# Patient Record
Sex: MALE | Race: WHITE | NOT HISPANIC OR LATINO | Employment: FULL TIME | URBAN - METROPOLITAN AREA
[De-identification: names, ages, dates, MRNs, and addresses within clinical notes are randomized per-mention and may not be internally consistent; named-entity substitution may affect disease eponyms.]

---

## 2023-03-09 DIAGNOSIS — E11.69 DM TYPE 2 WITH DIABETIC MIXED HYPERLIPIDEMIA (MULTI): Primary | ICD-10-CM

## 2023-03-09 DIAGNOSIS — E78.2 DM TYPE 2 WITH DIABETIC MIXED HYPERLIPIDEMIA (MULTI): Primary | ICD-10-CM

## 2023-03-09 RX ORDER — ATORVASTATIN CALCIUM 10 MG/1
10 TABLET, FILM COATED ORAL NIGHTLY
Qty: 90 TABLET | Refills: 3 | Status: SHIPPED | OUTPATIENT
Start: 2023-03-09 | End: 2024-02-23

## 2023-03-09 RX ORDER — ATORVASTATIN CALCIUM 10 MG/1
10 TABLET, FILM COATED ORAL NIGHTLY
COMMUNITY
End: 2023-03-09 | Stop reason: SDUPTHER

## 2023-03-09 NOTE — TELEPHONE ENCOUNTER
Requested Prescriptions     Pending Prescriptions Disp Refills    atorvastatin (Lipitor) 10 mg tablet 90 tablet 3     Sig: Take 1 tablet (10 mg) by mouth once daily at bedtime.     Last seen in office 1/10/2023.    Please advise.

## 2023-03-23 DIAGNOSIS — E11.9 TYPE 2 DIABETES MELLITUS WITHOUT COMPLICATION, WITHOUT LONG-TERM CURRENT USE OF INSULIN (MULTI): Primary | ICD-10-CM

## 2023-03-23 RX ORDER — METFORMIN HYDROCHLORIDE 500 MG/1
2 TABLET ORAL 2 TIMES DAILY
COMMUNITY
Start: 2018-07-01 | End: 2023-03-23 | Stop reason: SDUPTHER

## 2023-03-24 RX ORDER — METFORMIN HYDROCHLORIDE 500 MG/1
1000 TABLET ORAL 2 TIMES DAILY
Qty: 180 TABLET | Refills: 3 | Status: SHIPPED | OUTPATIENT
Start: 2023-03-24 | End: 2023-09-07

## 2023-08-01 PROBLEM — R19.5 POSITIVE COLORECTAL CANCER SCREENING USING COLOGUARD TEST: Status: ACTIVE | Noted: 2023-08-01

## 2023-08-01 PROBLEM — H52.13 BILATERAL MYOPIA: Status: ACTIVE | Noted: 2023-08-01

## 2023-08-01 PROBLEM — E11.9 TYPE 2 DIABETES MELLITUS WITHOUT COMPLICATIONS (MULTI): Status: ACTIVE | Noted: 2023-08-01

## 2023-08-01 PROBLEM — K12.0 RECURRENT CANKER SORES: Status: ACTIVE | Noted: 2023-08-01

## 2023-08-01 PROBLEM — I10 HTN (HYPERTENSION), BENIGN: Status: ACTIVE | Noted: 2023-08-01

## 2023-08-01 PROBLEM — I73.9 PAD (PERIPHERAL ARTERY DISEASE) (CMS-HCC): Status: ACTIVE | Noted: 2023-08-01

## 2023-08-01 PROBLEM — M79.604 PAIN OF RIGHT LOWER EXTREMITY: Status: ACTIVE | Noted: 2023-08-01

## 2023-08-01 PROBLEM — H53.8 BLURRED VISION: Status: ACTIVE | Noted: 2023-08-01

## 2023-08-01 PROBLEM — E78.5 HYPERLIPIDEMIA: Status: ACTIVE | Noted: 2023-08-01

## 2023-08-01 PROBLEM — R81 GLYCOSURIA: Status: ACTIVE | Noted: 2023-08-01

## 2023-08-01 PROBLEM — M21.622 TAILOR'S BUNION OF BOTH FEET: Status: ACTIVE | Noted: 2023-08-01

## 2023-08-01 PROBLEM — I73.9 INTERMITTENT CLAUDICATION (CMS-HCC): Status: ACTIVE | Noted: 2023-08-01

## 2023-08-01 PROBLEM — R79.89 ABNORMAL LFTS: Status: ACTIVE | Noted: 2023-08-01

## 2023-08-01 PROBLEM — L85.3 XEROSIS OF SKIN: Status: ACTIVE | Noted: 2023-08-01

## 2023-08-01 PROBLEM — H52.4 BILATERAL PRESBYOPIA: Status: ACTIVE | Noted: 2023-08-01

## 2023-08-01 PROBLEM — M21.621 TAILOR'S BUNION OF BOTH FEET: Status: ACTIVE | Noted: 2023-08-01

## 2023-08-01 PROBLEM — E11.3292: Status: ACTIVE | Noted: 2023-08-01

## 2023-08-01 RX ORDER — LISINOPRIL AND HYDROCHLOROTHIAZIDE 12.5; 2 MG/1; MG/1
0.5 TABLET ORAL DAILY
COMMUNITY
Start: 2018-07-18 | End: 2024-02-12 | Stop reason: SDUPTHER

## 2023-08-01 RX ORDER — ASPIRIN 81 MG/1
1 TABLET ORAL DAILY
COMMUNITY
Start: 2018-07-18

## 2023-08-01 RX ORDER — BLOOD SUGAR DIAGNOSTIC
STRIP MISCELLANEOUS
COMMUNITY
Start: 2018-09-13

## 2023-08-01 RX ORDER — PIOGLITAZONEHYDROCHLORIDE 30 MG/1
1 TABLET ORAL DAILY
COMMUNITY
Start: 2022-08-18 | End: 2023-11-08 | Stop reason: SDUPTHER

## 2023-08-01 RX ORDER — GLIPIZIDE 5 MG/1
TABLET ORAL
COMMUNITY
Start: 2019-10-30 | End: 2023-08-18 | Stop reason: SDUPTHER

## 2023-08-07 PROBLEM — I73.9 CLAUDICATION, INTERMITTENT (CMS-HCC): Status: ACTIVE | Noted: 2023-08-07

## 2023-08-07 PROBLEM — E11.9 DM TYPE 2 (DIABETES MELLITUS, TYPE 2) (MULTI): Status: ACTIVE | Noted: 2023-08-07

## 2023-08-08 ENCOUNTER — OFFICE VISIT (OUTPATIENT)
Dept: PRIMARY CARE | Facility: CLINIC | Age: 53
End: 2023-08-08
Payer: COMMERCIAL

## 2023-08-08 VITALS
RESPIRATION RATE: 18 BRPM | HEART RATE: 87 BPM | HEIGHT: 73 IN | OXYGEN SATURATION: 97 % | SYSTOLIC BLOOD PRESSURE: 130 MMHG | DIASTOLIC BLOOD PRESSURE: 78 MMHG | BODY MASS INDEX: 30.09 KG/M2 | TEMPERATURE: 95.5 F | WEIGHT: 227 LBS

## 2023-08-08 DIAGNOSIS — E11.3292: ICD-10-CM

## 2023-08-08 DIAGNOSIS — E78.5 HYPERLIPIDEMIA, UNSPECIFIED HYPERLIPIDEMIA TYPE: ICD-10-CM

## 2023-08-08 DIAGNOSIS — I73.9 INTERMITTENT CLAUDICATION (CMS-HCC): ICD-10-CM

## 2023-08-08 DIAGNOSIS — Z12.5 PROSTATE CANCER SCREENING: ICD-10-CM

## 2023-08-08 DIAGNOSIS — Z00.00 ANNUAL PHYSICAL EXAM: Primary | ICD-10-CM

## 2023-08-08 DIAGNOSIS — R79.89 ABNORMAL LFTS: ICD-10-CM

## 2023-08-08 DIAGNOSIS — E11.9 TYPE 2 DIABETES MELLITUS WITHOUT COMPLICATION, WITHOUT LONG-TERM CURRENT USE OF INSULIN (MULTI): ICD-10-CM

## 2023-08-08 LAB — POC HEMOGLOBIN A1C: 7.5 % (ref 4.2–6.5)

## 2023-08-08 PROCEDURE — 1036F TOBACCO NON-USER: CPT | Performed by: INTERNAL MEDICINE

## 2023-08-08 PROCEDURE — 3075F SYST BP GE 130 - 139MM HG: CPT | Performed by: INTERNAL MEDICINE

## 2023-08-08 PROCEDURE — 3078F DIAST BP <80 MM HG: CPT | Performed by: INTERNAL MEDICINE

## 2023-08-08 PROCEDURE — 83036 HEMOGLOBIN GLYCOSYLATED A1C: CPT | Performed by: INTERNAL MEDICINE

## 2023-08-08 PROCEDURE — 99396 PREV VISIT EST AGE 40-64: CPT | Performed by: INTERNAL MEDICINE

## 2023-08-08 PROCEDURE — 99214 OFFICE O/P EST MOD 30 MIN: CPT | Performed by: INTERNAL MEDICINE

## 2023-08-08 PROCEDURE — 93000 ELECTROCARDIOGRAM COMPLETE: CPT | Performed by: INTERNAL MEDICINE

## 2023-08-08 ASSESSMENT — PATIENT HEALTH QUESTIONNAIRE - PHQ9
2. FEELING DOWN, DEPRESSED OR HOPELESS: NOT AT ALL
1. LITTLE INTEREST OR PLEASURE IN DOING THINGS: NOT AT ALL
SUM OF ALL RESPONSES TO PHQ9 QUESTIONS 1 AND 2: 0

## 2023-08-08 ASSESSMENT — PAIN SCALES - GENERAL: PAINLEVEL: 0-NO PAIN

## 2023-08-08 NOTE — PATIENT INSTRUCTIONS
Plan  Annual physical done  EKG today   Discussed about repeat colonoscopy and shingle shot . Declines at present . Will let me know if decides  Blood and urine test ordered  Current medications are effective. advised to continue current medications.  F/U 6 months and yearly physical n 12 months .

## 2023-08-08 NOTE — PROGRESS NOTES
"My nurse note reviewed. Patient is here for:  7 month FUV and Annual Exam       Patient denies any shortness of breath, PND, orthopnea, chest pain , palpitation, syncope or edema in legs  Doing regular bike exercise   Quit smoking 3 months back .   Patient denies any shortness of breath, PND, orthopnea, chest pain , palpitation, syncope or edema in legs  patient denies any abdominal pain, tenderness, nausea, vomiting, change in bowel habits or blood in stool.  Patient denies any weakness in extremities.. Denies any headache, visual symptoms , speech problems or  tremors . No TIA or stroke like symptoms..    Taking meds ok     REVIEW OF SYSTEMS:   All other systems have been reviewed and are negative in relation to patient's complaint and other than what is mentioned in History of present illness.     OBJECTIVE :  Vitals noted   /78 (BP Location: Left arm, Patient Position: Sitting, BP Cuff Size: Adult)   Pulse 87   Temp 35.3 °C (95.5 °F)   Resp 18   Ht 1.854 m (6' 1\")   Wt 103 kg (227 lb)   SpO2 97%   BMI 29.95 kg/m²     Not in acute distress  Conj Pink, No icterus  Neck:No Cervical LN enlargement, No Thyroid enlargement No carotid bruit  Lungs: good air entry bilaterally, no rales or rhonchi  CVS: S1 S2 + , no S3. No loud heart murmur heard.   Abdomen: Soft, non tender , BS +. no organomegaly , no CVA tenderness  MSK: No spine tenderness or muscle tenderness noted on gross examination  CNS: Pt is alert, moving all 4 extremities. no motor weakness or abnormal movements noted on gross examination.  Extremities: No edema, No calf tenderness, Jaime's sign negative.      Assessment:  1. Annual physical exam  Done. Tests ordered      2. Type 2 diabetes mellitus without complication, without long-term current use of insulin (CMS/[facility])  POCT glycosylated hemoglobin (Hb A1C) manually resulted    ECG 12 lead    CBC and Auto Differential    Basic Metabolic Panel    Hepatic Function Panel    Lipid Panel Non-Fasting "    Thyroid Stimulating Hormone    Albumin , Urine Random      3. Mild nonproliferative diabetic retinopathy of left eye associated with type 2 diabetes mellitus, macular edema presence unspecified (CMS/HCC)  Sees eye doctor once a yr      4. Hyperlipidemia, unspecified hyperlipidemia type  ECG 12 lead      5. Intermittent claudication (CMS/HCC)  Symptoms better. Had PVR recently  reviewed results with pt      6. Abnormal LFTs  Hx of. recheck      7. Prostate cancer screening  Prostate Specific Antigen          Plan  Annual physical done  EKG today -was normal. Discussed with pt.   Discussed about repeat colonoscopy and shingle shot . Declines at present . Will let me know if decides  Blood and urine test ordered  A1C 7.5 . Discussed with pt  Congratulated on quitting smoking   Current medications are effective. advised to continue current medications.  F/U 6 months and yearly physical n 12 months .

## 2023-08-11 ENCOUNTER — LAB (OUTPATIENT)
Dept: LAB | Facility: LAB | Age: 53
End: 2023-08-11
Payer: COMMERCIAL

## 2023-08-11 DIAGNOSIS — Z12.5 PROSTATE CANCER SCREENING: ICD-10-CM

## 2023-08-11 DIAGNOSIS — E11.9 TYPE 2 DIABETES MELLITUS WITHOUT COMPLICATION, WITHOUT LONG-TERM CURRENT USE OF INSULIN (MULTI): ICD-10-CM

## 2023-08-11 LAB
ALANINE AMINOTRANSFERASE (SGPT) (U/L) IN SER/PLAS: 37 U/L (ref 10–52)
ALBUMIN (G/DL) IN SER/PLAS: 4 G/DL (ref 3.4–5)
ALBUMIN (MG/L) IN URINE: <7 MG/L
ALBUMIN/CREATININE (UG/MG) IN URINE: NORMAL UG/MG CRT (ref 0–30)
ALKALINE PHOSPHATASE (U/L) IN SER/PLAS: 86 U/L (ref 33–120)
ANION GAP IN SER/PLAS: 13 MMOL/L (ref 10–20)
ASPARTATE AMINOTRANSFERASE (SGOT) (U/L) IN SER/PLAS: 23 U/L (ref 9–39)
BASOPHILS (10*3/UL) IN BLOOD BY AUTOMATED COUNT: 0.04 X10E9/L (ref 0–0.1)
BASOPHILS/100 LEUKOCYTES IN BLOOD BY AUTOMATED COUNT: 0.5 % (ref 0–2)
BILIRUBIN DIRECT (MG/DL) IN SER/PLAS: 0.1 MG/DL (ref 0–0.3)
BILIRUBIN TOTAL (MG/DL) IN SER/PLAS: 0.4 MG/DL (ref 0–1.2)
CALCIUM (MG/DL) IN SER/PLAS: 9.5 MG/DL (ref 8.6–10.6)
CARBON DIOXIDE, TOTAL (MMOL/L) IN SER/PLAS: 27 MMOL/L (ref 21–32)
CHLORIDE (MMOL/L) IN SER/PLAS: 103 MMOL/L (ref 98–107)
CHOLESTEROL (MG/DL) IN SER/PLAS: 157 MG/DL (ref 0–199)
CHOLESTEROL IN HDL (MG/DL) IN SER/PLAS: 27.7 MG/DL
CHOLESTEROL/HDL RATIO: 5.7
CREATININE (MG/DL) IN SER/PLAS: 0.72 MG/DL (ref 0.5–1.3)
CREATININE (MG/DL) IN URINE: 59.2 MG/DL (ref 20–370)
EOSINOPHILS (10*3/UL) IN BLOOD BY AUTOMATED COUNT: 0.32 X10E9/L (ref 0–0.7)
EOSINOPHILS/100 LEUKOCYTES IN BLOOD BY AUTOMATED COUNT: 3.9 % (ref 0–6)
ERYTHROCYTE DISTRIBUTION WIDTH (RATIO) BY AUTOMATED COUNT: 15.6 % (ref 11.5–14.5)
ERYTHROCYTE MEAN CORPUSCULAR HEMOGLOBIN CONCENTRATION (G/DL) BY AUTOMATED: 31.5 G/DL (ref 32–36)
ERYTHROCYTE MEAN CORPUSCULAR VOLUME (FL) BY AUTOMATED COUNT: 81 FL (ref 80–100)
ERYTHROCYTES (10*6/UL) IN BLOOD BY AUTOMATED COUNT: 5.15 X10E12/L (ref 4.5–5.9)
GFR MALE: >90 ML/MIN/1.73M2
GLUCOSE (MG/DL) IN SER/PLAS: 116 MG/DL (ref 74–99)
HEMATOCRIT (%) IN BLOOD BY AUTOMATED COUNT: 41.6 % (ref 41–52)
HEMOGLOBIN (G/DL) IN BLOOD: 13.1 G/DL (ref 13.5–17.5)
IMMATURE GRANULOCYTES/100 LEUKOCYTES IN BLOOD BY AUTOMATED COUNT: 0.6 % (ref 0–0.9)
LEUKOCYTES (10*3/UL) IN BLOOD BY AUTOMATED COUNT: 8.2 X10E9/L (ref 4.4–11.3)
LYMPHOCYTES (10*3/UL) IN BLOOD BY AUTOMATED COUNT: 1.6 X10E9/L (ref 1.2–4.8)
LYMPHOCYTES/100 LEUKOCYTES IN BLOOD BY AUTOMATED COUNT: 19.4 % (ref 13–44)
MONOCYTES (10*3/UL) IN BLOOD BY AUTOMATED COUNT: 0.81 X10E9/L (ref 0.1–1)
MONOCYTES/100 LEUKOCYTES IN BLOOD BY AUTOMATED COUNT: 9.8 % (ref 2–10)
NEUTROPHILS (10*3/UL) IN BLOOD BY AUTOMATED COUNT: 5.41 X10E9/L (ref 1.2–7.7)
NEUTROPHILS/100 LEUKOCYTES IN BLOOD BY AUTOMATED COUNT: 65.8 % (ref 40–80)
NON-HDL CHOLESTEROL: 129 MG/DL
NRBC (PER 100 WBCS) BY AUTOMATED COUNT: 0 /100 WBC (ref 0–0)
PLATELETS (10*3/UL) IN BLOOD AUTOMATED COUNT: 376 X10E9/L (ref 150–450)
POTASSIUM (MMOL/L) IN SER/PLAS: 4.8 MMOL/L (ref 3.5–5.3)
PROSTATE SPECIFIC AG (NG/ML) IN SER/PLAS: 0.69 NG/ML (ref 0–4)
PROTEIN TOTAL: 6.4 G/DL (ref 6.4–8.2)
SODIUM (MMOL/L) IN SER/PLAS: 138 MMOL/L (ref 136–145)
THYROTROPIN (MIU/L) IN SER/PLAS BY DETECTION LIMIT <= 0.05 MIU/L: 1.62 MIU/L (ref 0.44–3.98)
UREA NITROGEN (MG/DL) IN SER/PLAS: 13 MG/DL (ref 6–23)

## 2023-08-11 PROCEDURE — 85025 COMPLETE CBC W/AUTO DIFF WBC: CPT

## 2023-08-11 PROCEDURE — 83718 ASSAY OF LIPOPROTEIN: CPT

## 2023-08-11 PROCEDURE — 80048 BASIC METABOLIC PNL TOTAL CA: CPT

## 2023-08-11 PROCEDURE — 80076 HEPATIC FUNCTION PANEL: CPT

## 2023-08-11 PROCEDURE — 84443 ASSAY THYROID STIM HORMONE: CPT

## 2023-08-11 PROCEDURE — 82465 ASSAY BLD/SERUM CHOLESTEROL: CPT

## 2023-08-11 PROCEDURE — 36415 COLL VENOUS BLD VENIPUNCTURE: CPT

## 2023-08-11 PROCEDURE — 82043 UR ALBUMIN QUANTITATIVE: CPT

## 2023-08-11 PROCEDURE — 84153 ASSAY OF PSA TOTAL: CPT

## 2023-08-11 PROCEDURE — 82570 ASSAY OF URINE CREATININE: CPT

## 2023-08-14 ENCOUNTER — TELEPHONE (OUTPATIENT)
Dept: PRIMARY CARE | Facility: CLINIC | Age: 53
End: 2023-08-14
Payer: COMMERCIAL

## 2023-08-14 NOTE — TELEPHONE ENCOUNTER
----- Message from Chrissy Mckeon sent at 8/14/2023 12:21 PM EDT -----    ----- Message -----  From: Lance Fregoso MD  Sent: 8/14/2023  12:00 PM EDT  To: Chrissy Mckeon    I have reviewed your recent blood tests ordered by me and there were no significant abnormality noted as compared to before. Good cholesterol, HDL, was again very low. Regular exercise should improve it   Please notify patient. Thanks.

## 2023-08-18 DIAGNOSIS — E11.9 TYPE 2 DIABETES MELLITUS WITHOUT COMPLICATION, WITH LONG-TERM CURRENT USE OF INSULIN (MULTI): Primary | ICD-10-CM

## 2023-08-18 DIAGNOSIS — Z79.4 TYPE 2 DIABETES MELLITUS WITHOUT COMPLICATION, WITH LONG-TERM CURRENT USE OF INSULIN (MULTI): Primary | ICD-10-CM

## 2023-08-19 RX ORDER — GLIPIZIDE 5 MG/1
TABLET ORAL
Qty: 270 TABLET | Refills: 3 | Status: SHIPPED | OUTPATIENT
Start: 2023-08-19

## 2023-09-07 DIAGNOSIS — E11.9 TYPE 2 DIABETES MELLITUS WITHOUT COMPLICATION, WITHOUT LONG-TERM CURRENT USE OF INSULIN (MULTI): ICD-10-CM

## 2023-09-07 RX ORDER — METFORMIN HYDROCHLORIDE 500 MG/1
TABLET ORAL
Qty: 120 TABLET | Refills: 5 | Status: SHIPPED | OUTPATIENT
Start: 2023-09-07 | End: 2024-02-22 | Stop reason: SDUPTHER

## 2023-11-08 DIAGNOSIS — E11.9 TYPE 2 DIABETES MELLITUS WITHOUT COMPLICATION, WITH LONG-TERM CURRENT USE OF INSULIN (MULTI): Primary | ICD-10-CM

## 2023-11-08 DIAGNOSIS — Z79.4 TYPE 2 DIABETES MELLITUS WITHOUT COMPLICATION, WITH LONG-TERM CURRENT USE OF INSULIN (MULTI): Primary | ICD-10-CM

## 2023-11-09 RX ORDER — PIOGLITAZONEHYDROCHLORIDE 30 MG/1
30 TABLET ORAL DAILY
Qty: 90 TABLET | Refills: 3 | Status: SHIPPED | OUTPATIENT
Start: 2023-11-09 | End: 2024-01-23 | Stop reason: SDUPTHER

## 2024-01-15 ENCOUNTER — OFFICE VISIT (OUTPATIENT)
Dept: OPHTHALMOLOGY | Facility: CLINIC | Age: 54
End: 2024-01-15
Payer: COMMERCIAL

## 2024-01-15 DIAGNOSIS — E11.9 TYPE 2 DIABETES MELLITUS WITHOUT COMPLICATION, WITHOUT LONG-TERM CURRENT USE OF INSULIN (MULTI): Primary | ICD-10-CM

## 2024-01-15 DIAGNOSIS — H52.13 BILATERAL MYOPIA: ICD-10-CM

## 2024-01-15 DIAGNOSIS — H52.4 BILATERAL PRESBYOPIA: ICD-10-CM

## 2024-01-15 PROCEDURE — 92015 DETERMINE REFRACTIVE STATE: CPT | Performed by: OPTOMETRIST

## 2024-01-15 PROCEDURE — 99214 OFFICE O/P EST MOD 30 MIN: CPT | Performed by: OPTOMETRIST

## 2024-01-15 ASSESSMENT — REFRACTION_MANIFEST
OS_SPHERE: -1.00
OS_CYLINDER: -0.50
OD_CYLINDER: -0.25
OS_SPHERE: -0.25
OD_AXIS: 085
OD_SPHERE: PLANO
OS_ADD: +1.75
OS_AXIS: 110
OD_AXIS: 087
OD_CYLINDER: -0.75
OD_ADD: +1.75
OD_SPHERE: -0.50
METHOD_AUTOREFRACTION: 1
OS_CYLINDER: SPHERE

## 2024-01-15 ASSESSMENT — REFRACTION_WEARINGRX
SPECS_TYPE: PAL
OS_CYLINDER: SPHERE
OS_SPHERE: -1.25
OS_CYLINDER: SPHERE
OD_SPHERE: -0.50
OD_SPHERE: -0.50
OS_ADD: +2.25
OD_ADD: +0.75
OD_CYLINDER: SPHERE
OS_SPHERE: -1.25
OS_ADD: +0.75
OD_ADD: +2.25
OD_CYLINDER: SPHERE

## 2024-01-15 ASSESSMENT — CONF VISUAL FIELD
OD_SUPERIOR_TEMPORAL_RESTRICTION: 0
METHOD: COUNTING FINGERS
OD_NORMAL: 1
OD_INFERIOR_TEMPORAL_RESTRICTION: 0
OS_INFERIOR_NASAL_RESTRICTION: 0
OS_NORMAL: 1
OS_INFERIOR_TEMPORAL_RESTRICTION: 0
OD_SUPERIOR_NASAL_RESTRICTION: 0
OS_SUPERIOR_NASAL_RESTRICTION: 0
OD_INFERIOR_NASAL_RESTRICTION: 0
OS_SUPERIOR_TEMPORAL_RESTRICTION: 0

## 2024-01-15 ASSESSMENT — REFRACTION
OS_CYLINDER: SPHERE
OD_AXIS: 085
OD_SPHERE: -0.50
OS_SPHERE: -0.50
OD_CYLINDER: -0.25

## 2024-01-15 ASSESSMENT — CUP TO DISC RATIO
OD_RATIO: .15
OS_RATIO: .15

## 2024-01-15 ASSESSMENT — ENCOUNTER SYMPTOMS
EYES NEGATIVE: 0
CARDIOVASCULAR NEGATIVE: 0
PSYCHIATRIC NEGATIVE: 0
GASTROINTESTINAL NEGATIVE: 0
ENDOCRINE NEGATIVE: 0
NEUROLOGICAL NEGATIVE: 0
MUSCULOSKELETAL NEGATIVE: 0
RESPIRATORY NEGATIVE: 0
ALLERGIC/IMMUNOLOGIC NEGATIVE: 0
CONSTITUTIONAL NEGATIVE: 0
HEMATOLOGIC/LYMPHATIC NEGATIVE: 0

## 2024-01-15 ASSESSMENT — TONOMETRY
IOP_METHOD: GOLDMANN APPLANATION
OS_IOP_MMHG: 16
OD_IOP_MMHG: 15

## 2024-01-15 ASSESSMENT — VISUAL ACUITY
OS_CC: 20/20
METHOD: SNELLEN - LINEAR
OS_CC+: .
CORRECTION_TYPE: GLASSES
OD_CC: 20/20

## 2024-01-15 ASSESSMENT — EXTERNAL EXAM - RIGHT EYE: OD_EXAM: NORMAL

## 2024-01-15 ASSESSMENT — EXTERNAL EXAM - LEFT EYE: OS_EXAM: NORMAL

## 2024-01-15 ASSESSMENT — SLIT LAMP EXAM - LIDS
COMMENTS: GOOD POSITION
COMMENTS: GOOD POSITION

## 2024-01-15 NOTE — PROGRESS NOTES
Assessment/Plan   Diagnoses and all orders for this visit:  Myopia, bilateral  Bilateral presbyopia  Type 2 diabetes mellitus without complication, without long-term current use of insulin (CMS/[facility])    New spec rx released today per patient request. Ocular health wnl for age OU. Monitor 1 year or sooner prn. Refraction billed today.     No signs of diabetic retinopathy today ou. Discussed cont good bs control, cont bp and lipid control as well. RTC with any changes in vision, otherwise monitor 1 year.

## 2024-01-23 DIAGNOSIS — Z79.4 TYPE 2 DIABETES MELLITUS WITHOUT COMPLICATION, WITH LONG-TERM CURRENT USE OF INSULIN (MULTI): ICD-10-CM

## 2024-01-23 DIAGNOSIS — E11.9 TYPE 2 DIABETES MELLITUS WITHOUT COMPLICATION, WITH LONG-TERM CURRENT USE OF INSULIN (MULTI): ICD-10-CM

## 2024-01-23 RX ORDER — PIOGLITAZONEHYDROCHLORIDE 30 MG/1
30 TABLET ORAL DAILY
Qty: 90 TABLET | Refills: 3 | Status: SHIPPED | OUTPATIENT
Start: 2024-01-23 | End: 2024-01-29 | Stop reason: SDUPTHER

## 2024-01-29 DIAGNOSIS — E11.9 TYPE 2 DIABETES MELLITUS WITHOUT COMPLICATION, WITH LONG-TERM CURRENT USE OF INSULIN (MULTI): ICD-10-CM

## 2024-01-29 DIAGNOSIS — Z79.4 TYPE 2 DIABETES MELLITUS WITHOUT COMPLICATION, WITH LONG-TERM CURRENT USE OF INSULIN (MULTI): ICD-10-CM

## 2024-01-29 RX ORDER — PIOGLITAZONEHYDROCHLORIDE 30 MG/1
30 TABLET ORAL DAILY
Qty: 90 TABLET | Refills: 3 | Status: SHIPPED | OUTPATIENT
Start: 2024-01-29 | End: 2024-01-30 | Stop reason: SDUPTHER

## 2024-01-30 DIAGNOSIS — E11.9 TYPE 2 DIABETES MELLITUS WITHOUT COMPLICATION, WITH LONG-TERM CURRENT USE OF INSULIN (MULTI): ICD-10-CM

## 2024-01-30 DIAGNOSIS — Z79.4 TYPE 2 DIABETES MELLITUS WITHOUT COMPLICATION, WITH LONG-TERM CURRENT USE OF INSULIN (MULTI): ICD-10-CM

## 2024-01-30 RX ORDER — PIOGLITAZONEHYDROCHLORIDE 30 MG/1
30 TABLET ORAL DAILY
Qty: 90 TABLET | Refills: 3 | Status: SHIPPED | OUTPATIENT
Start: 2024-01-30

## 2024-02-06 ENCOUNTER — OFFICE VISIT (OUTPATIENT)
Dept: VASCULAR SURGERY | Facility: CLINIC | Age: 54
End: 2024-02-06
Payer: COMMERCIAL

## 2024-02-06 VITALS
WEIGHT: 235 LBS | HEIGHT: 73 IN | HEART RATE: 89 BPM | DIASTOLIC BLOOD PRESSURE: 70 MMHG | BODY MASS INDEX: 31.14 KG/M2 | OXYGEN SATURATION: 98 % | SYSTOLIC BLOOD PRESSURE: 128 MMHG

## 2024-02-06 DIAGNOSIS — I73.9 PAD (PERIPHERAL ARTERY DISEASE) (CMS-HCC): ICD-10-CM

## 2024-02-06 DIAGNOSIS — I73.9 CLAUDICATION, INTERMITTENT (CMS-HCC): Primary | ICD-10-CM

## 2024-02-06 DIAGNOSIS — E11.9 TYPE 2 DIABETES MELLITUS WITHOUT COMPLICATION, WITHOUT LONG-TERM CURRENT USE OF INSULIN (MULTI): ICD-10-CM

## 2024-02-06 DIAGNOSIS — E66.01 OBESITIES, MORBID (MULTI): ICD-10-CM

## 2024-02-06 PROCEDURE — 99214 OFFICE O/P EST MOD 30 MIN: CPT | Performed by: NURSE PRACTITIONER

## 2024-02-06 PROCEDURE — 3074F SYST BP LT 130 MM HG: CPT | Performed by: NURSE PRACTITIONER

## 2024-02-06 PROCEDURE — 1036F TOBACCO NON-USER: CPT | Performed by: NURSE PRACTITIONER

## 2024-02-06 PROCEDURE — 3078F DIAST BP <80 MM HG: CPT | Performed by: NURSE PRACTITIONER

## 2024-02-06 NOTE — PROGRESS NOTES
Jose Romano is a 53 y.o. male     Subjective   This is a 53-year-old male who returns for follow-up and evaluation of his PAD and intermittent claudication.  Overall patient's claudication symptoms have reduced he does get claudication symptoms at the end of the day sometimes with cramping especially on the left lower extremity.  He is now able to walk extensive.  Before developing claudication symptoms greater than 200 yards he does use an elliptical for 25 to 30 minutes 4 to 5 days a week.  He is a diabetic and he has gained 15 pounds since last visit.  Patient has stopped smoking encouraged patient to continue smoking cessation.  Patient is 6 foot 1 inches tall weighs 235 pounds is a BMI of 31 and is a former smoker.    PMHx and PSHx: PAD with claudication, DM2, HTN, HLD, former smoker quit in April 2023, diabetic retinopathy.         Review of Systems A 10 point ROS was performed with the patient denying any complaint at this time aside from those listed in the HPI above.  OBJECTIVE    Vitals:    02/06/24 0700   BP: 128/70   Pulse: 89   SpO2: 98%      Physical Exam  Constitutional: Morbid obesity BMI 31, awake/alert/oriented x3, in no distress,  Eyes: Clear sclera  ENMT: mucous membranes are moist, no apparent injury, no lesions seen,   Head/neck: Neck supple, trachea  is midline, no apparent injury, no bruits, no mass, no stridor  Respiratory/thorax: Breath sounds clear and equal bilaterally slightly diminished with good chest expansion, thorax symmetric  Cardiac/Vascular: Regular, rate and rhythm, no murmurs, 2+ radial pulses, weak palpable bilateral femoral due to body habitus, monophasic left DP, biphasic left PT, mono to biphasic DP and PT on the right, capillary refill brisk no dependent rubor  Gastrointestinal: Nondistended soft nontender, positive bowel sounds, no bruits.  Musculoskeletal: Moves all extremities, limited range of motion , no joint swelling,   Extremities: No cyanosis, no contusions  or wounds,   Neurological: Alert and oriented x3,   Lymphatic: No significant lymphadenopathy  Skin: Warm and dry, no lesions, no rashes  Psychological: Appropriate mood and behavior    Current Outpatient Medications:     aspirin 81 mg EC tablet, Take 1 tablet (81 mg) by mouth once daily., Disp: , Rfl:     atorvastatin (Lipitor) 10 mg tablet, Take 1 tablet (10 mg) by mouth once daily at bedtime., Disp: 90 tablet, Rfl: 3    glipiZIDE (Glucotrol) 5 mg tablet, TAKE 2 TABLETS IN AM AND ONE IN PM, Disp: 270 tablet, Rfl: 3    lisinopriL-hydrochlorothiazide 20-12.5 mg tablet, Take 0.5 tablets by mouth once daily., Disp: , Rfl:     metFORMIN (Glucophage) 500 mg tablet, TAKE 2 TABLETS BY MOUTH EVERY MORNING AND TAKE 2 TABLETS AT BEDTIME, Disp: 120 tablet, Rfl: 5    OneTouch Ultra Test strip, TEST BLOOD ONCE A DAY TAKE AS DIRECTED., Disp: , Rfl:     pioglitazone (Actos) 30 mg tablet, Take 1 tablet (30 mg) by mouth once daily., Disp: 90 tablet, Rfl: 3     Lab Results   Component Value Date    WBC 8.2 08/11/2023    HGB 13.1 (L) 08/11/2023    HCT 41.6 08/11/2023     08/11/2023    CHOL 157 08/11/2023    TRIG 107 01/17/2019    HDL 27.7 (A) 08/11/2023    ALT 37 08/11/2023    AST 23 08/11/2023     08/11/2023    K 4.8 08/11/2023     08/11/2023    CREATININE 0.72 08/11/2023    BUN 13 08/11/2023    CO2 27 08/11/2023    TSH 1.62 08/11/2023    PSA 0.69 08/11/2023    HGBA1C 7.5 (A) 08/08/2023       Patient was never admitted.              Assessment/Plan   Problem List Items Addressed This Visit       PAD (peripheral artery disease) (CMS/HCC)    Relevant Orders    NEIL with exercise    Type 2 diabetes mellitus without complications (CMS/HCC)    Claudication, intermittent (CMS/HCC) - Primary    Obesities, morbid (CMS/HCC)     Reevaluated the results of patient's previous NEIL/PVRs in 2022 compared to 2023.  Explained the improvement overall.  Patient strongly advised weight reduction, patient gained 15 pounds since last  visit.  He states he is very hungry at the end of the day encouraged use of legumes during lunch, his blood sugars are elevated advised strongly tight glycemic control, statins, and antiplatelet therapy for PAD prophylaxis.  Patient overall I am happy that he has reduced his claudication symptoms and he is exercising 4-5 times a week with his elliptical for 25 to 30 minutes without any issues.  His claudication symptoms are more at the end of the day.  Will do NEIL with PVR and TBI's with treadmill in 6 months.    6-month follow-up for PAD, NEIL PVR TBI's with treadmill to be performed prior to office visit.    Thank you very much for allowing Vascular Surgery to be involved in the care of your patient sincerely Zia MONIQUE-CNP .  (This note was generated with voice recognition software and may contain errors including spelling, grammar, syntax and missed recognition of what was dictated, of which may not have been fully corrected)          Zia Newman, APRN-CNP

## 2024-02-08 ENCOUNTER — OFFICE VISIT (OUTPATIENT)
Dept: PRIMARY CARE | Facility: CLINIC | Age: 54
End: 2024-02-08
Payer: COMMERCIAL

## 2024-02-08 VITALS
BODY MASS INDEX: 31.01 KG/M2 | DIASTOLIC BLOOD PRESSURE: 88 MMHG | OXYGEN SATURATION: 98 % | TEMPERATURE: 96.4 F | HEART RATE: 84 BPM | SYSTOLIC BLOOD PRESSURE: 122 MMHG | HEIGHT: 73 IN | WEIGHT: 234 LBS

## 2024-02-08 DIAGNOSIS — E11.9 TYPE 2 DIABETES MELLITUS WITHOUT COMPLICATION, WITHOUT LONG-TERM CURRENT USE OF INSULIN (MULTI): Primary | ICD-10-CM

## 2024-02-08 DIAGNOSIS — E66.9 OBESITY (BMI 30-39.9): ICD-10-CM

## 2024-02-08 DIAGNOSIS — E11.3292: ICD-10-CM

## 2024-02-08 DIAGNOSIS — I10 HTN (HYPERTENSION), BENIGN: ICD-10-CM

## 2024-02-08 DIAGNOSIS — E78.5 HYPERLIPIDEMIA, UNSPECIFIED HYPERLIPIDEMIA TYPE: ICD-10-CM

## 2024-02-08 LAB — POC HEMOGLOBIN A1C: 8.1 % (ref 4.2–6.5)

## 2024-02-08 PROCEDURE — 3074F SYST BP LT 130 MM HG: CPT | Performed by: INTERNAL MEDICINE

## 2024-02-08 PROCEDURE — 1036F TOBACCO NON-USER: CPT | Performed by: INTERNAL MEDICINE

## 2024-02-08 PROCEDURE — 3079F DIAST BP 80-89 MM HG: CPT | Performed by: INTERNAL MEDICINE

## 2024-02-08 PROCEDURE — 83036 HEMOGLOBIN GLYCOSYLATED A1C: CPT | Performed by: INTERNAL MEDICINE

## 2024-02-08 PROCEDURE — 99214 OFFICE O/P EST MOD 30 MIN: CPT | Performed by: INTERNAL MEDICINE

## 2024-02-08 ASSESSMENT — PATIENT HEALTH QUESTIONNAIRE - PHQ9
SUM OF ALL RESPONSES TO PHQ9 QUESTIONS 1 AND 2: 0
1. LITTLE INTEREST OR PLEASURE IN DOING THINGS: NOT AT ALL
2. FEELING DOWN, DEPRESSED OR HOPELESS: NOT AT ALL

## 2024-02-08 NOTE — PATIENT INSTRUCTIONS
Plan  A1C today   Discussed about obesity and complications related to it. Discussed about weight reduction and regular exercise to decrease weight. Questions related to it answered to patient's satisfaction.  Declines colonoscopy and shingle shot at present . Will let me know if decides  F/U in August for physical .

## 2024-02-08 NOTE — PROGRESS NOTES
"My nurse note reviewed. Patient is here for:  Follow-up         Here for f/U on DM , PVD, HLD,    Does exercise 4-5 times a week for about 30 min each time.     Patient denies any shortness of breath, PND, orthopnea, chest pain , palpitation, syncope or edema in legs    Current Outpatient Medications   Medication Instructions    aspirin 81 mg EC tablet 1 tablet, oral, Daily    atorvastatin (LIPITOR) 10 mg, oral, Nightly    glipiZIDE (Glucotrol) 5 mg tablet TAKE 2 TABLETS IN AM AND ONE IN PM    lisinopriL-hydrochlorothiazide 20-12.5 mg tablet 0.5 tablets, oral, Daily    metFORMIN (Glucophage) 500 mg tablet TAKE 2 TABLETS BY MOUTH EVERY MORNING AND TAKE 2 TABLETS AT BEDTIME    OneTouch Ultra Test strip TEST BLOOD ONCE A DAY TAKE AS DIRECTED.    pioglitazone (ACTOS) 30 mg, oral, Daily       I have reviewed all active medications patient is currently on . Questions related to medication answered to patient's satisfaction.  He has been taking actos 15 mg lately .     REVIEW OF SYSTEMS:   All other systems have been reviewed and are negative in relation to patient's complaint and other than what is mentioned in History of present illness.    Hx of colonic polyps, was advised to repeat it in a yr in , pt jefferson to repeat it at present. Understands consequences  Works as     Mother  few weeks ago in [place]. She had dementia   OBJECTIVE :  /88   Pulse 84   Temp 35.8 °C (96.4 °F)   Ht 1.854 m (6' 1\")   Wt 106 kg (234 lb)   SpO2 98%   BMI 30.87 kg/m²   Obese   CVS: S1 S2 + , no S3. No loud heart murmur appreciated. Lungs clear, No edema  CNS: Patient is alert, oriented moving all 4 extremities well. No motor weakness noted on gross neurological exam. No involuntary movements or tremors noted.      Assessment:  1. Type 2 diabetes mellitus without complication, without long-term current use of insulin (CMS/[facility])  uncontrolled  Discussed diet . Continue actos 30 mg a day . He sometimes was taking " 15 mg a day       2. Mild nonproliferative diabetic retinopathy of left eye associated with type 2 diabetes mellitus, macular edema presence unspecified (CMS/HCC)  Hx of.       3. Hyperlipidemia, unspecified hyperlipidemia type  Hx of. On med.       4. Obesity (BMI 30-39.9)  -wt reduction       5. HTN (hypertension), benign  -controlled            Plan  A1C today -it was 8.1 . Discussed goal. He will cut back on his white breads which he eats a lot as per him.  Discussed about obesity and complications related to it. Discussed about weight reduction and regular exercise to decrease weight. Questions related to it answered to patient's satisfaction.  Declines colonoscopy and shingle shot at present . Will let me know if decides  F/U in August for physical .

## 2024-02-12 DIAGNOSIS — I10 HTN (HYPERTENSION), BENIGN: Primary | ICD-10-CM

## 2024-02-12 RX ORDER — LISINOPRIL AND HYDROCHLOROTHIAZIDE 12.5; 2 MG/1; MG/1
0.5 TABLET ORAL DAILY
Qty: 90 TABLET | Refills: 3 | Status: SHIPPED | OUTPATIENT
Start: 2024-02-12

## 2024-02-22 DIAGNOSIS — E11.69 DM TYPE 2 WITH DIABETIC MIXED HYPERLIPIDEMIA (MULTI): ICD-10-CM

## 2024-02-22 DIAGNOSIS — E11.9 TYPE 2 DIABETES MELLITUS WITHOUT COMPLICATION, WITHOUT LONG-TERM CURRENT USE OF INSULIN (MULTI): ICD-10-CM

## 2024-02-22 DIAGNOSIS — E78.2 DM TYPE 2 WITH DIABETIC MIXED HYPERLIPIDEMIA (MULTI): ICD-10-CM

## 2024-02-22 RX ORDER — METFORMIN HYDROCHLORIDE 500 MG/1
TABLET ORAL
Qty: 120 TABLET | Refills: 5 | Status: SHIPPED | OUTPATIENT
Start: 2024-02-22

## 2024-02-23 RX ORDER — METFORMIN HYDROCHLORIDE 500 MG/1
TABLET ORAL
Qty: 120 TABLET | Refills: 5 | Status: SHIPPED | OUTPATIENT
Start: 2024-02-23

## 2024-02-23 RX ORDER — ATORVASTATIN CALCIUM 10 MG/1
10 TABLET, FILM COATED ORAL NIGHTLY
Qty: 30 TABLET | Refills: 11 | Status: SHIPPED | OUTPATIENT
Start: 2024-02-23

## 2024-06-18 DIAGNOSIS — E11.9 TYPE 2 DIABETES MELLITUS WITHOUT COMPLICATION, WITHOUT LONG-TERM CURRENT USE OF INSULIN (MULTI): ICD-10-CM

## 2024-06-19 RX ORDER — METFORMIN HYDROCHLORIDE 500 MG/1
TABLET ORAL
Qty: 360 TABLET | Refills: 1 | Status: SHIPPED | OUTPATIENT
Start: 2024-06-19

## 2024-08-06 ENCOUNTER — APPOINTMENT (OUTPATIENT)
Dept: VASCULAR SURGERY | Facility: CLINIC | Age: 54
End: 2024-08-06
Payer: COMMERCIAL

## 2024-08-15 ENCOUNTER — APPOINTMENT (OUTPATIENT)
Dept: PRIMARY CARE | Facility: CLINIC | Age: 54
End: 2024-08-15
Payer: COMMERCIAL

## 2024-08-20 ENCOUNTER — APPOINTMENT (OUTPATIENT)
Dept: PRIMARY CARE | Facility: CLINIC | Age: 54
End: 2024-08-20
Payer: COMMERCIAL

## 2024-08-20 VITALS
DIASTOLIC BLOOD PRESSURE: 88 MMHG | SYSTOLIC BLOOD PRESSURE: 136 MMHG | OXYGEN SATURATION: 98 % | BODY MASS INDEX: 31.66 KG/M2 | HEART RATE: 91 BPM | WEIGHT: 240 LBS | TEMPERATURE: 97.2 F

## 2024-08-20 DIAGNOSIS — R79.89 ABNORMAL LFTS: ICD-10-CM

## 2024-08-20 DIAGNOSIS — Z12.5 PROSTATE CANCER SCREENING: ICD-10-CM

## 2024-08-20 DIAGNOSIS — E11.9 TYPE 2 DIABETES MELLITUS WITHOUT COMPLICATION, WITHOUT LONG-TERM CURRENT USE OF INSULIN (MULTI): ICD-10-CM

## 2024-08-20 DIAGNOSIS — E78.5 HYPERLIPIDEMIA, UNSPECIFIED HYPERLIPIDEMIA TYPE: ICD-10-CM

## 2024-08-20 DIAGNOSIS — Z00.00 ANNUAL PHYSICAL EXAM: Primary | ICD-10-CM

## 2024-08-20 DIAGNOSIS — I73.9 PAD (PERIPHERAL ARTERY DISEASE) (CMS-HCC): ICD-10-CM

## 2024-08-20 DIAGNOSIS — E66.9 OBESITY (BMI 30-39.9): ICD-10-CM

## 2024-08-20 DIAGNOSIS — Z79.4 TYPE 2 DIABETES MELLITUS WITHOUT COMPLICATION, WITH LONG-TERM CURRENT USE OF INSULIN (MULTI): ICD-10-CM

## 2024-08-20 DIAGNOSIS — I10 HTN (HYPERTENSION), BENIGN: ICD-10-CM

## 2024-08-20 DIAGNOSIS — E11.9 TYPE 2 DIABETES MELLITUS WITHOUT COMPLICATION, WITH LONG-TERM CURRENT USE OF INSULIN (MULTI): ICD-10-CM

## 2024-08-20 LAB — POC HEMOGLOBIN A1C: 8.2 % (ref 4.2–6.5)

## 2024-08-20 PROCEDURE — 99214 OFFICE O/P EST MOD 30 MIN: CPT | Performed by: INTERNAL MEDICINE

## 2024-08-20 PROCEDURE — 3079F DIAST BP 80-89 MM HG: CPT | Performed by: INTERNAL MEDICINE

## 2024-08-20 PROCEDURE — 99396 PREV VISIT EST AGE 40-64: CPT | Performed by: INTERNAL MEDICINE

## 2024-08-20 PROCEDURE — 83036 HEMOGLOBIN GLYCOSYLATED A1C: CPT | Performed by: INTERNAL MEDICINE

## 2024-08-20 PROCEDURE — 3075F SYST BP GE 130 - 139MM HG: CPT | Performed by: INTERNAL MEDICINE

## 2024-08-20 PROCEDURE — 1036F TOBACCO NON-USER: CPT | Performed by: INTERNAL MEDICINE

## 2024-08-20 RX ORDER — PIOGLITAZONEHYDROCHLORIDE 45 MG/1
45 TABLET ORAL DAILY
Qty: 90 TABLET | Refills: 3 | Status: SHIPPED | OUTPATIENT
Start: 2024-08-20 | End: 2025-08-20

## 2024-08-20 ASSESSMENT — PATIENT HEALTH QUESTIONNAIRE - PHQ9
1. LITTLE INTEREST OR PLEASURE IN DOING THINGS: NOT AT ALL
2. FEELING DOWN, DEPRESSED OR HOPELESS: NOT AT ALL
SUM OF ALL RESPONSES TO PHQ9 QUESTIONS 1 AND 2: 0

## 2024-08-20 NOTE — PROGRESS NOTES
My nurse note reviewed. Patient is here for:  Follow-up and Annual Exam              Pt is here for annual physical and follow up   Hx of DM , HTN , HLD.   Taking meds ok   Accuchecks 125 to 130 range as per him   Patient denies any shortness of breath, PND, orthopnea, chest pain , palpitation, syncope or edema in legs  patient denies any abdominal pain, tenderness, nausea, vomiting, change in bowel habits or blood in stool.  Patient denies any weakness in extremities.. Denies any headache, visual symptoms , speech problems or  tremors . No TIA or stroke like symptoms..    Still gets occ crampy feeling on walking, sees vascular doctor , quit smoking for over a year .     No drinking at all.     Lives alone. No children     REVIEW OF SYSTEMS:   All other systems have been reviewed and are negative in relation to patient's complaint and other than what is mentioned in History of present illness.  I have reviewed all active medications patient is currently on . Questions related to medication answered to patient's satisfaction.        OBJECTIVE :    /88   Pulse 91   Temp 36.2 °C (97.2 °F)   Wt 109 kg (240 lb)   SpO2 98%   BMI 31.66 kg/m²   Vitals noted, repeat bp is ok  Not in acute distress  Conj Pink, No icterus  ears exam normal  Neck:No Cervical LN enlargement, No Thyroid enlargement No carotid bruit  Lungs: good air entry bilaterally, no rales or rhonchi  CVS: S1 S2 + , no S3. No loud heart murmur heard.   Abdomen: Soft, non tender , BS +. no organomegaly , no CVA tenderness  MSK: No spine tenderness or muscle tenderness noted on gross examination  CNS: Pt is alert, moving all 4 extremities. no motor weakness or abnormal movements noted on gross examination.  Extremities: No edema, No calf tenderness, Jaime's sign negative. DTR + knee and wrists, Rhomberg's neg    Assessment:  1. Annual physical exam  Dpme./ tests prdered      2. Type 2 diabetes mellitus without complication, without long-term current  use of insulin (Multi)  CBC and Auto Differential    Basic Metabolic Panel    Hepatic Function Panel    Lipid Panel Non-Fasting    Prostate Specific Antigen    Thyroid Stimulating Hormone      3. HTN (hypertension), benign  Ok control. Will moniktor      4. Hyperlipidemia, unspecified hyperlipidemia type  Check blood test, on meds      5. Abnormal LFTs  Blood tests      6. Prostate cancer screening        7. PAD (peripheral artery disease) (CMS-[facility])  Sees vascular doctor      8. Obesity (BMI 30-39.9)  Wt reduction       9. Type 2 diabetes mellitus without complication, with long-term current use of insulin (Multi)  pioglitazone (Actos) 45 mg tablet        Plan  Annual physical done.   Blood and urine  tests ordered   A1c was 8.2 today   Pt declines to have colonoscopy . He is overdue on it. Understands consequences   Declines shingle shot   Discussed about obesity and complications related to it. Discussed about weight reduction and regular exercise to decrease weight. Questions related to it answered to patient's satisfaction.  Increase Actos to 45 mg a day from 30 mg a day   Monitor sugar at home     F/U 3 months for diabetic f/U .

## 2024-08-20 NOTE — PATIENT INSTRUCTIONS
Plan  Annual physical done.   Blood and urine  tests ordered   A1c was 8.2 today   Pt declines to have colonoscopy . He is overdue on it. Understands consequences   Declines shingle shot   Discussed about obesity and complications related to it. Discussed about weight reduction and regular exercise to decrease weight. Questions related to it answered to patient's satisfaction.  Increase Actos to 45 mg a day from 30 mg a day   Monitor sugar at home     F/U 3 months for diabetic f/U .

## 2024-08-24 ENCOUNTER — LAB (OUTPATIENT)
Dept: LAB | Facility: LAB | Age: 54
End: 2024-08-24
Payer: COMMERCIAL

## 2024-08-24 DIAGNOSIS — Z12.5 PROSTATE CANCER SCREENING: ICD-10-CM

## 2024-08-24 DIAGNOSIS — E11.9 TYPE 2 DIABETES MELLITUS WITHOUT COMPLICATION, WITHOUT LONG-TERM CURRENT USE OF INSULIN (MULTI): ICD-10-CM

## 2024-08-24 DIAGNOSIS — E11.9 TYPE 2 DIABETES MELLITUS WITHOUT COMPLICATION, WITH LONG-TERM CURRENT USE OF INSULIN (MULTI): ICD-10-CM

## 2024-08-24 DIAGNOSIS — Z79.4 TYPE 2 DIABETES MELLITUS WITHOUT COMPLICATION, WITH LONG-TERM CURRENT USE OF INSULIN (MULTI): ICD-10-CM

## 2024-08-24 LAB
ALBUMIN SERPL BCP-MCNC: 4 G/DL (ref 3.4–5)
ALP SERPL-CCNC: 68 U/L (ref 33–120)
ALT SERPL W P-5'-P-CCNC: 25 U/L (ref 10–52)
ANION GAP SERPL CALC-SCNC: 12 MMOL/L (ref 10–20)
AST SERPL W P-5'-P-CCNC: 19 U/L (ref 9–39)
BASOPHILS # BLD AUTO: 0.03 X10*3/UL (ref 0–0.1)
BASOPHILS NFR BLD AUTO: 0.5 %
BILIRUB DIRECT SERPL-MCNC: 0.1 MG/DL (ref 0–0.3)
BILIRUB SERPL-MCNC: 0.5 MG/DL (ref 0–1.2)
BUN SERPL-MCNC: 17 MG/DL (ref 6–23)
CALCIUM SERPL-MCNC: 9.3 MG/DL (ref 8.6–10.3)
CHLORIDE SERPL-SCNC: 99 MMOL/L (ref 98–107)
CHOLEST SERPL-MCNC: 130 MG/DL (ref 0–199)
CHOLESTEROL/HDL RATIO: 4.7
CO2 SERPL-SCNC: 27 MMOL/L (ref 21–32)
CREAT SERPL-MCNC: 0.75 MG/DL (ref 0.5–1.3)
CREAT UR-MCNC: 71.1 MG/DL (ref 20–370)
EGFRCR SERPLBLD CKD-EPI 2021: >90 ML/MIN/1.73M*2
EOSINOPHIL # BLD AUTO: 0.15 X10*3/UL (ref 0–0.7)
EOSINOPHIL NFR BLD AUTO: 2.6 %
ERYTHROCYTE [DISTWIDTH] IN BLOOD BY AUTOMATED COUNT: 16.8 % (ref 11.5–14.5)
GLUCOSE SERPL-MCNC: 128 MG/DL (ref 74–99)
HCT VFR BLD AUTO: 41.3 % (ref 41–52)
HDLC SERPL-MCNC: 27.4 MG/DL
HGB BLD-MCNC: 12.9 G/DL (ref 13.5–17.5)
IMM GRANULOCYTES # BLD AUTO: 0.05 X10*3/UL (ref 0–0.7)
IMM GRANULOCYTES NFR BLD AUTO: 0.9 % (ref 0–0.9)
LYMPHOCYTES # BLD AUTO: 1.55 X10*3/UL (ref 1.2–4.8)
LYMPHOCYTES NFR BLD AUTO: 26.9 %
MCH RBC QN AUTO: 23 PG (ref 26–34)
MCHC RBC AUTO-ENTMCNC: 31.2 G/DL (ref 32–36)
MCV RBC AUTO: 74 FL (ref 80–100)
MICROALBUMIN UR-MCNC: <7 MG/L
MICROALBUMIN/CREAT UR: NORMAL MG/G{CREAT}
MONOCYTES # BLD AUTO: 0.58 X10*3/UL (ref 0.1–1)
MONOCYTES NFR BLD AUTO: 10.1 %
NEUTROPHILS # BLD AUTO: 3.41 X10*3/UL (ref 1.2–7.7)
NEUTROPHILS NFR BLD AUTO: 59 %
NON-HDL CHOLESTEROL: 103 MG/DL (ref 0–149)
NRBC BLD-RTO: 0 /100 WBCS (ref 0–0)
PLATELET # BLD AUTO: 399 X10*3/UL (ref 150–450)
POTASSIUM SERPL-SCNC: 4.6 MMOL/L (ref 3.5–5.3)
PROT SERPL-MCNC: 6.6 G/DL (ref 6.4–8.2)
PSA SERPL-MCNC: 0.94 NG/ML
RBC # BLD AUTO: 5.62 X10*6/UL (ref 4.5–5.9)
SODIUM SERPL-SCNC: 133 MMOL/L (ref 136–145)
TSH SERPL-ACNC: 1.85 MIU/L (ref 0.44–3.98)
WBC # BLD AUTO: 5.8 X10*3/UL (ref 4.4–11.3)

## 2024-08-24 PROCEDURE — 82465 ASSAY BLD/SERUM CHOLESTEROL: CPT

## 2024-08-24 PROCEDURE — 85025 COMPLETE CBC W/AUTO DIFF WBC: CPT

## 2024-08-24 PROCEDURE — 82043 UR ALBUMIN QUANTITATIVE: CPT

## 2024-08-24 PROCEDURE — 82570 ASSAY OF URINE CREATININE: CPT

## 2024-08-24 PROCEDURE — 84443 ASSAY THYROID STIM HORMONE: CPT

## 2024-08-24 PROCEDURE — 83718 ASSAY OF LIPOPROTEIN: CPT

## 2024-08-24 PROCEDURE — 82248 BILIRUBIN DIRECT: CPT

## 2024-08-24 PROCEDURE — 36415 COLL VENOUS BLD VENIPUNCTURE: CPT

## 2024-08-24 PROCEDURE — 80053 COMPREHEN METABOLIC PANEL: CPT

## 2024-08-24 PROCEDURE — 84153 ASSAY OF PSA TOTAL: CPT

## 2024-08-26 ENCOUNTER — HOSPITAL ENCOUNTER (OUTPATIENT)
Dept: VASCULAR MEDICINE | Facility: HOSPITAL | Age: 54
Discharge: HOME | End: 2024-08-26
Payer: COMMERCIAL

## 2024-08-26 ENCOUNTER — TELEPHONE (OUTPATIENT)
Dept: PRIMARY CARE | Facility: CLINIC | Age: 54
End: 2024-08-26

## 2024-08-26 ENCOUNTER — APPOINTMENT (OUTPATIENT)
Dept: VASCULAR SURGERY | Facility: CLINIC | Age: 54
End: 2024-08-26
Payer: COMMERCIAL

## 2024-08-26 DIAGNOSIS — I73.9 PAD (PERIPHERAL ARTERY DISEASE) (CMS-HCC): ICD-10-CM

## 2024-08-26 PROCEDURE — 93924 LWR XTR VASC STDY BILAT: CPT | Performed by: SURGERY

## 2024-08-26 PROCEDURE — 93924 LWR XTR VASC STDY BILAT: CPT

## 2024-08-26 NOTE — TELEPHONE ENCOUNTER
----- Message from Lance Fregoso sent at 8/26/2024 12:54 PM EDT -----  I have reviewed your recent blood tests ordered by me and there were no significant abnormality noted as compared to before.   Please notify patient. Thanks.

## 2024-09-05 DIAGNOSIS — E11.9 TYPE 2 DIABETES MELLITUS WITHOUT COMPLICATION, WITH LONG-TERM CURRENT USE OF INSULIN (MULTI): ICD-10-CM

## 2024-09-05 DIAGNOSIS — Z79.4 TYPE 2 DIABETES MELLITUS WITHOUT COMPLICATION, WITH LONG-TERM CURRENT USE OF INSULIN (MULTI): ICD-10-CM

## 2024-09-05 RX ORDER — GLIPIZIDE 5 MG/1
TABLET ORAL
Qty: 90 TABLET | Refills: 11 | Status: SHIPPED | OUTPATIENT
Start: 2024-09-05

## 2024-09-16 ENCOUNTER — APPOINTMENT (OUTPATIENT)
Dept: VASCULAR SURGERY | Facility: CLINIC | Age: 54
End: 2024-09-16
Payer: COMMERCIAL

## 2024-09-16 VITALS — OXYGEN SATURATION: 97 % | HEIGHT: 73 IN | BODY MASS INDEX: 33.27 KG/M2 | WEIGHT: 251 LBS | HEART RATE: 93 BPM

## 2024-09-16 DIAGNOSIS — E66.01 OBESITIES, MORBID (MULTI): ICD-10-CM

## 2024-09-16 DIAGNOSIS — I73.9 PAD (PERIPHERAL ARTERY DISEASE) (CMS-HCC): Primary | ICD-10-CM

## 2024-09-16 DIAGNOSIS — I73.9 INTERMITTENT CLAUDICATION (CMS-HCC): ICD-10-CM

## 2024-09-16 PROCEDURE — 3062F POS MACROALBUMINURIA REV: CPT | Performed by: NURSE PRACTITIONER

## 2024-09-16 PROCEDURE — 99214 OFFICE O/P EST MOD 30 MIN: CPT | Performed by: NURSE PRACTITIONER

## 2024-09-16 PROCEDURE — 3008F BODY MASS INDEX DOCD: CPT | Performed by: NURSE PRACTITIONER

## 2024-09-16 PROCEDURE — 1036F TOBACCO NON-USER: CPT | Performed by: NURSE PRACTITIONER

## 2024-09-16 NOTE — PROGRESS NOTES
Jose Romano is a 54 y.o. male     Subjective   This is a 54-year-old male who returns for evaluation and treatment of his PAD.  Patient had NEIL/PVRs with TBI's with exercise performed on 9/5/2024.  Patient's feels his claudication symptoms are stable.  He is able to walk up to 2 miles before any symptoms occur.  He denies any signs of limb ischemia.  He is a diabetic.  He has gained 16 pounds since last visit and is working on weight reduction.  Patient is 6 foot 1 inches tall weighs 251 pounds his BMI is 33.1, he is a former smoker he quit in 2023.  His hemoglobin A1c is elevated and he will be working on better glycemic control.    PMHx and PSH PAD with claudication: DM2, HLD, former smoker quit in April 2023, diabetic retinopathy             Review of Systems A 10 point ROS was performed with the patient denying any complaint at this time aside from those listed in the HPI above.  OBJECTIVE    Vitals:    09/16/24 1300   Pulse: 93   SpO2: 97%      Physical Exam  Constitutional: Well developed , awake/alert/oriented x3, in no distress,  Eyes: Clear sclera  ENMT: mucous membranes are moist, no apparent injury, no lesions seen,   Head/neck: Neck supple, trachea  is midline, no apparent injury, no bruits, no mass, no stridor  Respiratory/thorax: Breath sounds clear and equal bilaterally with good chest expansion, thorax symmetric  Cardiac/Vascular: Regular, rate and rhythm, no murmurs, 2+ radial pulses, palpable bilateral femorals, multiphasic dopplerable signals DP and PTs bilaterally, foot warm capillary refill brisk  Gastrointestinal: Nondistended soft nontender, positive bowel sounds, no bruits.  Musculoskeletal: Moves all extremities, limited range of motion , no joint swelling,   Extremities: No cyanosis, no contusions or wounds,   Neurological: Alert and oriented x3,   Lymphatic: No significant lymphadenopathy  Skin: Warm and dry, no lesions, no rashes  Psychological: Appropriate mood and  behavior    Current Outpatient Medications:     aspirin 81 mg EC tablet, Take 1 tablet (81 mg) by mouth once daily., Disp: , Rfl:     atorvastatin (Lipitor) 10 mg tablet, TAKE 1 TABLET (10 MG) BY MOUTH ONCE DAILY AT BEDTIME., Disp: 30 tablet, Rfl: 11    glipiZIDE (Glucotrol) 5 mg tablet, TAKE 2 TABLETS IN THE MORNING AND 1 TABLET IN THE EVENING, Disp: 90 tablet, Rfl: 11    lisinopriL-hydrochlorothiazide 20-12.5 mg tablet, Take 0.5 tablets by mouth once daily., Disp: 90 tablet, Rfl: 3    metFORMIN (Glucophage) 500 mg tablet, TAKE 2 TABLETS BY MOUTH EVERY MORNING AND TAKE 2 TABLETS AT BEDTIME, Disp: 360 tablet, Rfl: 1    OneTouch Ultra Test strip, TEST BLOOD ONCE A DAY TAKE AS DIRECTED., Disp: , Rfl:     pioglitazone (Actos) 45 mg tablet, Take 1 tablet (45 mg) by mouth once daily., Disp: 90 tablet, Rfl: 3     Lab Results   Component Value Date    WBC 5.8 08/24/2024    HGB 12.9 (L) 08/24/2024    HCT 41.3 08/24/2024     08/24/2024    CHOL 130 08/24/2024    TRIG 107 01/17/2019    HDL 27.4 08/24/2024    ALT 25 08/24/2024    AST 19 08/24/2024     (L) 08/24/2024    K 4.6 08/24/2024    CL 99 08/24/2024    CREATININE 0.75 08/24/2024    BUN 17 08/24/2024    CO2 27 08/24/2024    TSH 1.85 08/24/2024    PSA 0.94 08/24/2024    HGBA1C 8.2 (A) 08/20/2024       NEIL with exercise    Result Date: 8/26/2024           [facility] [address] [place] [address] Tel [phone] and Fax [phone]  Vascular Lab Report [facility] US ANKLE BRACHIAL INDEX (NEIL) WITH EXERCISE  Patient Name:     ALLA Piña Physician: [id number] Freddy Waters MD Study Date:       8/26/2024          Ordering           [id number] NORAH BLANC                                      Physician:         MILAGRO MRN/PID:          [id number]           Technologist:      Bianca Garcia RUST Accession#:       [id number]       Technologist 2: Date of           1970 / 54       Encounter#:        [id number] Birth/Age:        years Gender:           M Admission Status: Outpatient         Location           [facility]                                      Performed:  Diagnosis/ICD: Peripheral vascular disease, unspecified-I73.9 Indication:    Peripheral vascular disease CPT Codes:     78152 Peripheral artery PVR with exercise  CONCLUSIONS: Right Lower PVR: Evidence of moderate arterial occlusive disease in the right lower extremity at rest. Hemodynamics are further compromised in the right lower extremity with exercise. Decreased digital perfusion noted. Monophasic flow is noted in the right posterior tibial artery and right dorsalis pedis artery. Multiphasic flow is noted in the right common femoral artery. Left Lower PVR: Evidence of moderate arterial occlusive disease in the left lower extremity at rest. Hemodynamics are further compromised in the left lower extremity with exercise. Decreased digital perfusion noted. Monophasic flow is noted in the left posterior tibial artery and left dorsalis pedis artery. Multiphasic flow is noted in the left common femoral artery.  Comparison: Compared with study from 7/20/2023, no significant change.Degree of atherosclerosis has changed from mild to moderate bilaterally.  Imaging & Doppler Findings:  RIGHT Lower PVR                Pressures Ratios Right Posterior Tibial (Ankle) 72 mmHg   0.57 Right Dorsalis Pedis (Ankle)   76 mmHg   0.60 Right Digit (Great Toe)        65 mmHg   0.51  Right Ankle Post Exercise      53 mmHg   0.39 5 Minute Pressure              76 mmHg   0.61  LEFT Lower PVR                Pressures Ratios Left Posterior Tibial (Ankle) 84 mmHg   0.66 Left Dorsalis Pedis (Ankle)   79 mmHg   0.62 Left Digit (Great Toe)        56 mmHg   0.44  Left Ankle Post Exercise      60 mmHg   0.44 5 Minute Pressure             87 mmHg   0.70                    Right     Left Brachial Pressure 127 mmHg 127 mmHg   [id number] Freddy Waters MD  Electronically signed by [id number] Freddy Waters MD on 8/26/2024 at 5:13:16 PM  ** Final **             Patient's NEIL PVR revealed monophasic PT/DP right with corresponding ratios of 0.57 0.60 with right digit great toe is 0.51 right ankle posterior exercise 0.39 in 5 minutes his pressure was 0.61 ratio left lower extremity left PT monophasic 0.66 left DP monophasic 0.62 left digit 0.44 left ankle post exercise 0.44 and after 5 minutes was 0.70.  This information was relayed to patient patient shows no significant degree of change but a degree of atherosclerosis has changed for mild to moderate bilaterally.    Assessment/Plan   Problem List Items Addressed This Visit       Intermittent claudication (CMS-HCC)    PAD (peripheral artery disease) (CMS-HCC) - Primary     Patient denies any increasing claudication symptoms he is able to walk approximately 2 miles he does work out on his elliptical 5 days a week for 30 minutes he has increased weight of 16 pounds since last visit I advised him continue weight reduction and tight glycemic control to reduce his hemoglobin A1c.  Patient is to continue daily antiplatelet therapy, maximize statin, and antihypertensive medication for PAD  prophylaxis. There was a shared discussion with the patient to continue a lifestyle modification that promotes: The adherence to strict BP and glycemic control, healthy dietary habit changes, incorporation of daily exercise regimen, adherence to all prescription/OTC medication schedules, attendance to all follow-up appointments, cessation from smoking , abstinence from alcohol and illicit drug use if applicable.  Patient is to continue exercising on a regular basis for 30 minutes or longer.  Please contact if there is a sudden decrease in walking distance due to claudication.  Dopplerable signals at visit were multiphasic uncertain of the monophasic signals obtained.  Will repeat NEIL PVRs in 1 year.    Patient to follow-up in 6 months for his  PAD    Thank you very much for allowing Vascular Surgery to be involved in the care of your patient sincerely Zia MONIQUE-CNP .  (This note was generated with voice recognition software and may contain errors including spelling, grammar, syntax and missed recognition of what was dictated, of which may not have been fully corrected)      Zia Newman, APRN-CNP

## 2024-11-18 ENCOUNTER — APPOINTMENT (OUTPATIENT)
Dept: PRIMARY CARE | Facility: CLINIC | Age: 54
End: 2024-11-18
Payer: COMMERCIAL

## 2024-11-18 VITALS
WEIGHT: 245 LBS | SYSTOLIC BLOOD PRESSURE: 132 MMHG | HEART RATE: 93 BPM | BODY MASS INDEX: 32.32 KG/M2 | DIASTOLIC BLOOD PRESSURE: 84 MMHG | OXYGEN SATURATION: 97 %

## 2024-11-18 DIAGNOSIS — E11.9 TYPE 2 DIABETES MELLITUS WITHOUT COMPLICATION, WITH LONG-TERM CURRENT USE OF INSULIN (MULTI): Primary | ICD-10-CM

## 2024-11-18 DIAGNOSIS — E66.9 OBESITY (BMI 30-39.9): ICD-10-CM

## 2024-11-18 DIAGNOSIS — Z79.4 TYPE 2 DIABETES MELLITUS WITHOUT COMPLICATION, WITH LONG-TERM CURRENT USE OF INSULIN (MULTI): Primary | ICD-10-CM

## 2024-11-18 LAB — POC HEMOGLOBIN A1C: 7.5 % (ref 4.2–6.5)

## 2024-11-18 PROCEDURE — 3079F DIAST BP 80-89 MM HG: CPT | Performed by: INTERNAL MEDICINE

## 2024-11-18 PROCEDURE — 99213 OFFICE O/P EST LOW 20 MIN: CPT | Performed by: INTERNAL MEDICINE

## 2024-11-18 PROCEDURE — 1036F TOBACCO NON-USER: CPT | Performed by: INTERNAL MEDICINE

## 2024-11-18 PROCEDURE — 3062F POS MACROALBUMINURIA REV: CPT | Performed by: INTERNAL MEDICINE

## 2024-11-18 PROCEDURE — 3075F SYST BP GE 130 - 139MM HG: CPT | Performed by: INTERNAL MEDICINE

## 2024-11-18 PROCEDURE — 83036 HEMOGLOBIN GLYCOSYLATED A1C: CPT | Performed by: INTERNAL MEDICINE

## 2024-11-18 NOTE — PATIENT INSTRUCTIONS
Plan  Discussed about obesity and complications related to it. Discussed about weight reduction and regular exercise to decrease weight. Questions related to it answered to patient's satisfaction.  A1C is down to 7.5 now .   Declines flu , shingle shot   Current medications are effective. advised to continue current medications.  F/U in April and physical in August.

## 2024-11-18 NOTE — PROGRESS NOTES
My nurse note reviewed. Patient is here for:  Follow-up       Pt is here mainly for his diabetes follow up   On last visit his Actos dose was increased due to high A1C  Lab Results   Component Value Date    HGBA1C 8.2 (A) 08/20/2024     Recent lab results were discussed with patient. Questions related to it answered. Patient felt satisfied with it.    Taking meds ok   Patient denies any shortness of breath, PND, orthopnea, chest pain , palpitation, syncope or edema in legs  Does exercise 5 days a week  OBJECTIVE :  /84   Pulse 93   Wt 111 kg (245 lb)   SpO2 97%   BMI 32.32 kg/m²   Obese  CVS: S1 S2 + , no S3. No loud heart murmur appreciated. Lungs clear, No edema    Assessment:  1. Type 2 diabetes mellitus without complication, with long-term current use of insulin (Multi) -better than before.    2. Obesity (BMI 30-39.9) -wt reduction      Plan  Discussed about obesity and complications related to it. Discussed about weight reduction and regular exercise to decrease weight. Questions related to it answered to patient's satisfaction.  A1C is down to 7.5 now .   Declines flu , shingle shot   Current medications are effective. advised to continue current medications.  F/U in April and physical in August.

## 2024-12-16 DIAGNOSIS — E78.2 DM TYPE 2 WITH DIABETIC MIXED HYPERLIPIDEMIA (MULTI): ICD-10-CM

## 2024-12-16 DIAGNOSIS — E11.69 DM TYPE 2 WITH DIABETIC MIXED HYPERLIPIDEMIA (MULTI): ICD-10-CM

## 2024-12-17 RX ORDER — ATORVASTATIN CALCIUM 10 MG/1
10 TABLET, FILM COATED ORAL NIGHTLY
Qty: 90 TABLET | Refills: 3 | Status: SHIPPED | OUTPATIENT
Start: 2024-12-17

## 2025-01-06 DIAGNOSIS — E11.9 TYPE 2 DIABETES MELLITUS WITHOUT COMPLICATION, WITHOUT LONG-TERM CURRENT USE OF INSULIN (MULTI): ICD-10-CM

## 2025-01-07 RX ORDER — METFORMIN HYDROCHLORIDE 500 MG/1
TABLET ORAL
Qty: 360 TABLET | Refills: 1 | Status: SHIPPED | OUTPATIENT
Start: 2025-01-07

## 2025-01-16 ENCOUNTER — APPOINTMENT (OUTPATIENT)
Dept: OPHTHALMOLOGY | Facility: CLINIC | Age: 55
End: 2025-01-16
Payer: COMMERCIAL

## 2025-03-09 DIAGNOSIS — Z79.4 TYPE 2 DIABETES MELLITUS WITHOUT COMPLICATION, WITH LONG-TERM CURRENT USE OF INSULIN (MULTI): ICD-10-CM

## 2025-03-09 DIAGNOSIS — E11.9 TYPE 2 DIABETES MELLITUS WITHOUT COMPLICATION, WITH LONG-TERM CURRENT USE OF INSULIN (MULTI): ICD-10-CM

## 2025-03-10 RX ORDER — PIOGLITAZONEHYDROCHLORIDE 30 MG/1
30 TABLET ORAL DAILY
Qty: 90 TABLET | Refills: 3 | Status: SHIPPED | OUTPATIENT
Start: 2025-03-10

## 2025-03-17 DIAGNOSIS — I10 HTN (HYPERTENSION), BENIGN: ICD-10-CM

## 2025-03-20 RX ORDER — LISINOPRIL AND HYDROCHLOROTHIAZIDE 12.5; 2 MG/1; MG/1
0.5 TABLET ORAL DAILY
Qty: 45 TABLET | Refills: 3 | Status: SHIPPED | OUTPATIENT
Start: 2025-03-20 | End: 2026-03-20

## 2025-03-24 ENCOUNTER — APPOINTMENT (OUTPATIENT)
Dept: VASCULAR SURGERY | Facility: CLINIC | Age: 55
End: 2025-03-24
Payer: COMMERCIAL

## 2025-04-07 ENCOUNTER — APPOINTMENT (OUTPATIENT)
Dept: OPHTHALMOLOGY | Facility: CLINIC | Age: 55
End: 2025-04-07
Payer: COMMERCIAL

## 2025-04-07 DIAGNOSIS — H52.4 BILATERAL PRESBYOPIA: ICD-10-CM

## 2025-04-07 DIAGNOSIS — E11.9 TYPE 2 DIABETES MELLITUS WITHOUT COMPLICATION, WITHOUT LONG-TERM CURRENT USE OF INSULIN: Primary | ICD-10-CM

## 2025-04-07 DIAGNOSIS — H52.13 BILATERAL MYOPIA: ICD-10-CM

## 2025-04-07 PROCEDURE — 99214 OFFICE O/P EST MOD 30 MIN: CPT | Performed by: OPTOMETRIST

## 2025-04-07 ASSESSMENT — REFRACTION_WEARINGRX
OS_CYLINDER: SPHERE
OS_CYLINDER: SPHERE
SPECS_TYPE: PAL
OD_SPHERE: -0.50
OD_ADD: +2.25
OS_ADD: +0.75
OS_SPHERE: -1.25
OD_CYLINDER: SPHERE
OD_CYLINDER: SPHERE
OS_SPHERE: -1.25
OD_SPHERE: -0.50
OD_ADD: +0.75
OS_ADD: +2.25

## 2025-04-07 ASSESSMENT — REFRACTION_MANIFEST
OS_SPHERE: -0.75
OS_CYLINDER: -0.25
OD_AXIS: 050
OD_SPHERE: -0.75
OS_AXIS: 125
OS_SPHERE: -0.75
OS_AXIS: 155
OD_SPHERE: -0.75
OD_ADD: +2.25
OD_CYLINDER: -0.25
OD_CYLINDER: SPHERE
METHOD_AUTOREFRACTION: 1
OS_ADD: +2.25
OS_CYLINDER: -0.25

## 2025-04-07 ASSESSMENT — ENCOUNTER SYMPTOMS
PSYCHIATRIC NEGATIVE: 0
HEMATOLOGIC/LYMPHATIC NEGATIVE: 0
CONSTITUTIONAL NEGATIVE: 0
ALLERGIC/IMMUNOLOGIC NEGATIVE: 0
NEUROLOGICAL NEGATIVE: 0
EYES NEGATIVE: 1
MUSCULOSKELETAL NEGATIVE: 0
GASTROINTESTINAL NEGATIVE: 0
CARDIOVASCULAR NEGATIVE: 0
RESPIRATORY NEGATIVE: 0
ENDOCRINE NEGATIVE: 1

## 2025-04-07 ASSESSMENT — CONF VISUAL FIELD
OD_SUPERIOR_TEMPORAL_RESTRICTION: 0
METHOD: COUNTING FINGERS
OD_NORMAL: 1
OS_NORMAL: 1
OS_SUPERIOR_NASAL_RESTRICTION: 0
OS_INFERIOR_TEMPORAL_RESTRICTION: 0
OS_INFERIOR_NASAL_RESTRICTION: 0
OS_SUPERIOR_TEMPORAL_RESTRICTION: 0
OD_INFERIOR_TEMPORAL_RESTRICTION: 0
OD_SUPERIOR_NASAL_RESTRICTION: 0
OD_INFERIOR_NASAL_RESTRICTION: 0

## 2025-04-07 ASSESSMENT — CUP TO DISC RATIO
OS_RATIO: .15
OD_RATIO: .15

## 2025-04-07 ASSESSMENT — VISUAL ACUITY
CORRECTION_TYPE: GLASSES
OS_CC: 20/20
OD_CC: 20/20
METHOD: SNELLEN - LINEAR

## 2025-04-07 ASSESSMENT — EXTERNAL EXAM - RIGHT EYE: OD_EXAM: NORMAL

## 2025-04-07 ASSESSMENT — REFRACTION
OD_SPHERE: -0.25
OS_CYLINDER: -0.25
OS_AXIS: 155
OS_SPHERE: -0.50
OD_CYLINDER: SPHERE

## 2025-04-07 ASSESSMENT — TONOMETRY
IOP_METHOD: GOLDMANN APPLANATION
OS_IOP_MMHG: 20
OD_IOP_MMHG: 18

## 2025-04-07 ASSESSMENT — EXTERNAL EXAM - LEFT EYE: OS_EXAM: NORMAL

## 2025-04-07 ASSESSMENT — SLIT LAMP EXAM - LIDS
COMMENTS: GOOD POSITION
COMMENTS: GOOD POSITION

## 2025-04-07 NOTE — PROGRESS NOTES
Assessment/Plan   Diagnoses and all orders for this visit:  Type 2 diabetes mellitus without complication, without long-term current use of insulin  no retinopathy observed on exam today od/os, pt ed to continue good BGlc, blood pressure and lipid control, rtc with any changes in vision, otherwise monitor 1 year     Bilateral myopia  Bilateral presbyopia  Pt declines spec Rx today. No refraction billed. Ok to continue w/ habitual glasses.

## 2025-04-21 ENCOUNTER — APPOINTMENT (OUTPATIENT)
Dept: PRIMARY CARE | Facility: CLINIC | Age: 55
End: 2025-04-21
Payer: COMMERCIAL

## 2025-04-21 VITALS
TEMPERATURE: 96.6 F | HEART RATE: 94 BPM | DIASTOLIC BLOOD PRESSURE: 86 MMHG | SYSTOLIC BLOOD PRESSURE: 126 MMHG | OXYGEN SATURATION: 96 % | WEIGHT: 245 LBS | BODY MASS INDEX: 32.32 KG/M2

## 2025-04-21 DIAGNOSIS — E11.9 TYPE 2 DIABETES MELLITUS WITHOUT COMPLICATION, WITH LONG-TERM CURRENT USE OF INSULIN: Primary | ICD-10-CM

## 2025-04-21 DIAGNOSIS — I10 HTN (HYPERTENSION), BENIGN: ICD-10-CM

## 2025-04-21 DIAGNOSIS — E78.5 HYPERLIPIDEMIA, UNSPECIFIED HYPERLIPIDEMIA TYPE: ICD-10-CM

## 2025-04-21 DIAGNOSIS — E66.9 OBESITY (BMI 30-39.9): ICD-10-CM

## 2025-04-21 DIAGNOSIS — E11.3292 MILD NONPROLIFERATIVE DIABETIC RETINOPATHY OF LEFT EYE ASSOCIATED WITH TYPE 2 DIABETES MELLITUS, MACULAR EDEMA PRESENCE UNSPECIFIED: ICD-10-CM

## 2025-04-21 DIAGNOSIS — Z79.4 TYPE 2 DIABETES MELLITUS WITHOUT COMPLICATION, WITH LONG-TERM CURRENT USE OF INSULIN: Primary | ICD-10-CM

## 2025-04-21 LAB — POC HEMOGLOBIN A1C: 8 % (ref 4.2–6.5)

## 2025-04-21 PROCEDURE — 3052F HG A1C>EQUAL 8.0%<EQUAL 9.0%: CPT | Performed by: INTERNAL MEDICINE

## 2025-04-21 PROCEDURE — 1036F TOBACCO NON-USER: CPT | Performed by: INTERNAL MEDICINE

## 2025-04-21 PROCEDURE — 3074F SYST BP LT 130 MM HG: CPT | Performed by: INTERNAL MEDICINE

## 2025-04-21 PROCEDURE — 99214 OFFICE O/P EST MOD 30 MIN: CPT | Performed by: INTERNAL MEDICINE

## 2025-04-21 PROCEDURE — 3079F DIAST BP 80-89 MM HG: CPT | Performed by: INTERNAL MEDICINE

## 2025-04-21 PROCEDURE — 83036 HEMOGLOBIN GLYCOSYLATED A1C: CPT | Performed by: INTERNAL MEDICINE

## 2025-04-21 ASSESSMENT — PATIENT HEALTH QUESTIONNAIRE - PHQ9
1. LITTLE INTEREST OR PLEASURE IN DOING THINGS: NOT AT ALL
SUM OF ALL RESPONSES TO PHQ9 QUESTIONS 1 AND 2: 0
2. FEELING DOWN, DEPRESSED OR HOPELESS: NOT AT ALL

## 2025-04-21 ASSESSMENT — PAIN SCALES - GENERAL: PAINLEVEL_OUTOF10: 0-NO PAIN

## 2025-04-21 ASSESSMENT — ENCOUNTER SYMPTOMS: DEPRESSION: 0

## 2025-04-21 NOTE — PATIENT INSTRUCTIONS
Plan  Current medications are effective. advised to continue current medications.  Discussed about obesity and complications related to it. Discussed about weight reduction and regular exercise to decrease weight. Questions related to it answered to patient's satisfaction.  A1C today   F/U in August for physical . As scheduled.

## 2025-04-21 NOTE — PROGRESS NOTES
"Pt is here for f/U on DM , HTN , HLD    He is obese , doing regular exercise  with elliptical 5 times a week  Patient denies any shortness of breath, PND, orthopnea, chest pain , palpitation, syncope or edema in legs    Taking meds ok .    I have reviewed all active medications patient is currently on . Questions related to medication answered to patient's satisfaction.    OBJECTIVE :  /86 (BP Location: Left arm, Patient Position: Sitting)   Pulse 94   Temp 35.9 °C (96.6 °F)   Wt 111 kg (245 lb)   SpO2 96%   BMI 32.32 kg/m²     Obese CVS: S1 S2 + , no S3. No loud heart murmur appreciated. Lungs clear, No edema  CNS: Patient is alert, oriented moving all 4 extremities well. No motor weakness noted on gross neurological exam. No involuntary movements or tremors noted.    Assessment:  1. Type 2 diabetes mellitus without complication, with long-term current use of insulin -check a1c POCT glycosylated hemoglobin (Hb A1C) manually resulted      2. Obesity (BMI 30-39.9)  Discussed about obesity and complications related to it. Discussed about weight reduction and regular exercise to decrease weight. Questions related to it answered to patient's satisfaction.        3. HTN (hypertension), benign  controlled        4. Hyperlipidemia, unspecified hyperlipidemia type  On statin      5. 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 Saw few weeks ago         Lab Results   Component Value Date    HGBA1C 7.5 (A) 11/18/2024     Lab Results   Component Value Date    CHOL 130 08/24/2024    CHOL 157 08/11/2023    CHOL 145 08/18/2022     Lab Results   Component Value Date    HDL 27.4 08/24/2024    HDL 27.7 (A) 08/11/2023    HDL 25.5 (A) 08/18/2022     No results found for: \"LDLCALC\"  Lab Results   Component Value Date    TRIG 107 01/17/2019    TRIG 126 09/13/2018    TRIG 266 (H) 06/28/2018     No components " "found for: \"CHOLHDL\"      Plan  Current medications are effective. advised to continue current medications.  Discussed about obesity and complications related to it. Discussed about weight reduction and regular exercise to decrease weight. Questions related to it answered to patient's satisfaction.  A1C today -it was 8. Goal discussed . Will follow   F/U in August for physical . As scheduled.       "

## 2025-06-21 DIAGNOSIS — E11.9 TYPE 2 DIABETES MELLITUS WITHOUT COMPLICATION, WITHOUT LONG-TERM CURRENT USE OF INSULIN: ICD-10-CM

## 2025-06-23 RX ORDER — METFORMIN HYDROCHLORIDE 500 MG/1
TABLET ORAL
Qty: 360 TABLET | Refills: 1 | Status: SHIPPED | OUTPATIENT
Start: 2025-06-23

## 2025-08-25 ENCOUNTER — APPOINTMENT (OUTPATIENT)
Dept: PRIMARY CARE | Facility: CLINIC | Age: 55
End: 2025-08-25
Payer: COMMERCIAL

## 2025-09-02 ENCOUNTER — APPOINTMENT (OUTPATIENT)
Dept: PRIMARY CARE | Facility: CLINIC | Age: 55
End: 2025-09-02
Payer: COMMERCIAL

## 2025-09-02 ASSESSMENT — PATIENT HEALTH QUESTIONNAIRE - PHQ9
SUM OF ALL RESPONSES TO PHQ9 QUESTIONS 1 AND 2: 0
2. FEELING DOWN, DEPRESSED OR HOPELESS: NOT AT ALL
1. LITTLE INTEREST OR PLEASURE IN DOING THINGS: NOT AT ALL

## 2025-09-05 DIAGNOSIS — E11.9 TYPE 2 DIABETES MELLITUS WITHOUT COMPLICATION, WITH LONG-TERM CURRENT USE OF INSULIN: ICD-10-CM

## 2025-09-05 DIAGNOSIS — Z79.4 TYPE 2 DIABETES MELLITUS WITHOUT COMPLICATION, WITH LONG-TERM CURRENT USE OF INSULIN: ICD-10-CM

## 2025-09-05 RX ORDER — GLIPIZIDE 5 MG/1
5-10 TABLET ORAL 2 TIMES DAILY
Qty: 270 TABLET | Refills: 3 | Status: SHIPPED | OUTPATIENT
Start: 2025-09-05

## 2025-09-05 RX ORDER — PIOGLITAZONE 30 MG/1
30 TABLET ORAL DAILY
Qty: 90 TABLET | Refills: 3 | Status: SHIPPED | OUTPATIENT
Start: 2025-09-05

## 2025-09-22 ENCOUNTER — APPOINTMENT (OUTPATIENT)
Dept: VASCULAR SURGERY | Facility: CLINIC | Age: 55
End: 2025-09-22
Payer: COMMERCIAL

## 2026-03-02 ENCOUNTER — APPOINTMENT (OUTPATIENT)
Dept: PRIMARY CARE | Facility: CLINIC | Age: 56
End: 2026-03-02
Payer: COMMERCIAL

## 2026-04-10 ENCOUNTER — APPOINTMENT (OUTPATIENT)
Dept: OPHTHALMOLOGY | Facility: CLINIC | Age: 56
End: 2026-04-10
Payer: COMMERCIAL

## 2026-09-08 ENCOUNTER — APPOINTMENT (OUTPATIENT)
Dept: PRIMARY CARE | Facility: CLINIC | Age: 56
End: 2026-09-08
Payer: COMMERCIAL